# Patient Record
Sex: MALE | Race: BLACK OR AFRICAN AMERICAN | Employment: FULL TIME | URBAN - METROPOLITAN AREA
[De-identification: names, ages, dates, MRNs, and addresses within clinical notes are randomized per-mention and may not be internally consistent; named-entity substitution may affect disease eponyms.]

---

## 2021-11-30 ENCOUNTER — HOSPITAL ENCOUNTER (OUTPATIENT)
Dept: GENERAL RADIOLOGY | Age: 46
Discharge: HOME | End: 2021-11-30
Payer: COMMERCIAL

## 2021-11-30 ENCOUNTER — OFFICE VISIT (OUTPATIENT)
Dept: FAMILY MEDICINE CLINIC | Age: 46
End: 2021-11-30
Payer: COMMERCIAL

## 2021-11-30 VITALS
SYSTOLIC BLOOD PRESSURE: 123 MMHG | TEMPERATURE: 98.5 F | DIASTOLIC BLOOD PRESSURE: 71 MMHG | BODY MASS INDEX: 25.01 KG/M2 | OXYGEN SATURATION: 98 % | WEIGHT: 165 LBS | RESPIRATION RATE: 18 BRPM | HEART RATE: 81 BPM | HEIGHT: 68 IN

## 2021-11-30 DIAGNOSIS — R06.02 SHORTNESS OF BREATH: ICD-10-CM

## 2021-11-30 DIAGNOSIS — R05.9 COUGH: ICD-10-CM

## 2021-11-30 DIAGNOSIS — Z11.59 ENCOUNTER FOR HEPATITIS C SCREENING TEST FOR LOW RISK PATIENT: ICD-10-CM

## 2021-11-30 DIAGNOSIS — R06.02 SHORTNESS OF BREATH: Primary | ICD-10-CM

## 2021-11-30 DIAGNOSIS — E11.9 TYPE 2 DIABETES MELLITUS WITHOUT COMPLICATION, WITHOUT LONG-TERM CURRENT USE OF INSULIN (HCC): ICD-10-CM

## 2021-11-30 DIAGNOSIS — K59.09 OTHER CONSTIPATION: ICD-10-CM

## 2021-11-30 DIAGNOSIS — E78.00 PURE HYPERCHOLESTEROLEMIA: ICD-10-CM

## 2021-11-30 DIAGNOSIS — E55.9 VITAMIN D DEFICIENCY: ICD-10-CM

## 2021-11-30 DIAGNOSIS — I10 ESSENTIAL HYPERTENSION: ICD-10-CM

## 2021-11-30 PROCEDURE — 99204 OFFICE O/P NEW MOD 45 MIN: CPT | Performed by: NURSE PRACTITIONER

## 2021-11-30 PROCEDURE — 71046 X-RAY EXAM CHEST 2 VIEWS: CPT

## 2021-11-30 RX ORDER — AMLODIPINE BESYLATE 5 MG/1
5 TABLET ORAL DAILY
Qty: 90 TABLET | Refills: 1 | Status: SHIPPED | OUTPATIENT
Start: 2021-11-30 | End: 2022-05-03 | Stop reason: SDUPTHER

## 2021-11-30 RX ORDER — PROMETHAZINE HYDROCHLORIDE AND DEXTROMETHORPHAN HYDROBROMIDE 6.25; 15 MG/5ML; MG/5ML
5 SYRUP ORAL
Qty: 210 ML | Refills: 1 | Status: SHIPPED | OUTPATIENT
Start: 2021-11-30 | End: 2021-12-16 | Stop reason: ALTCHOICE

## 2021-11-30 RX ORDER — LOSARTAN POTASSIUM 100 MG/1
100 TABLET ORAL DAILY
Qty: 90 TABLET | Refills: 1 | Status: SHIPPED | OUTPATIENT
Start: 2021-11-30

## 2021-11-30 RX ORDER — LOSARTAN POTASSIUM 100 MG/1
100 TABLET ORAL DAILY
COMMUNITY
Start: 2021-09-26 | End: 2021-11-30 | Stop reason: SDUPTHER

## 2021-11-30 RX ORDER — DOCUSATE SODIUM 100 MG/1
CAPSULE, LIQUID FILLED ORAL
Qty: 180 CAPSULE | Refills: 3 | Status: SHIPPED | OUTPATIENT
Start: 2021-11-30

## 2021-11-30 RX ORDER — LANCETS
EACH MISCELLANEOUS
Qty: 100 EACH | Refills: 11 | Status: SHIPPED | OUTPATIENT
Start: 2021-11-30

## 2021-11-30 RX ORDER — ISOPROPYL ALCOHOL 70 ML/100ML
SWAB TOPICAL
Qty: 100 PAD | Refills: 11 | Status: SHIPPED | OUTPATIENT
Start: 2021-11-30

## 2021-11-30 RX ORDER — AMLODIPINE BESYLATE 5 MG/1
5 TABLET ORAL DAILY
COMMUNITY
Start: 2021-09-26 | End: 2021-11-30 | Stop reason: SDUPTHER

## 2021-11-30 RX ORDER — POLYETHYLENE GLYCOL 3350 17 G/17G
POWDER, FOR SOLUTION ORAL
Qty: 850 G | Refills: 3 | Status: SHIPPED | OUTPATIENT
Start: 2021-11-30

## 2021-11-30 RX ORDER — METFORMIN HYDROCHLORIDE 850 MG/1
850 TABLET ORAL DAILY
COMMUNITY
Start: 2021-09-26 | End: 2022-05-03 | Stop reason: SDUPTHER

## 2021-11-30 RX ORDER — BLOOD SUGAR DIAGNOSTIC
STRIP MISCELLANEOUS
Qty: 100 STRIP | Refills: 11 | Status: SHIPPED | OUTPATIENT
Start: 2021-11-30

## 2021-11-30 NOTE — PATIENT INSTRUCTIONS
Constipation: Care Instructions  Your Care Instructions     Constipation means that you have a hard time passing stools (bowel movements). People pass stools from 3 times a day to once every 3 days. What is normal for you may be different. Constipation may occur with pain in the rectum and cramping. The pain may get worse when you try to pass stools. Sometimes there are small amounts of bright red blood on toilet paper or the surface of stools. This is because of enlarged veins near the rectum (hemorrhoids). A few changes in your diet and lifestyle may help you avoid ongoing constipation. Your doctor may also prescribe medicine to help loosen your stool. Some medicines can cause constipation. These include pain medicines and antidepressants. Tell your doctor about all the medicines you take. Your doctor may want to make a medicine change to ease your symptoms. Follow-up care is a key part of your treatment and safety. Be sure to make and go to all appointments, and call your doctor if you are having problems. It's also a good idea to know your test results and keep a list of the medicines you take. How can you care for yourself at home? · Drink plenty of fluids. If you have kidney, heart, or liver disease and have to limit fluids, talk with your doctor before you increase the amount of fluids you drink. · Include high-fiber foods in your diet each day. These include fruits, vegetables, beans, and whole grains. · Get at least 30 minutes of exercise on most days of the week. Walking is a good choice. You also may want to do other activities, such as running, swimming, cycling, or playing tennis or team sports. · Take a fiber supplement, such as Citrucel or Metamucil, every day. Read and follow all instructions on the label. · Schedule time each day for a bowel movement. A daily routine may help. Take your time having your bowel movement.   · Support your feet with a small step stool when you sit on the toilet. This helps flex your hips and places your pelvis in a squatting position. · Your doctor may recommend an over-the-counter laxative to relieve your constipation. Examples are Milk of Magnesia and MiraLax. Read and follow all instructions on the label. Do not use laxatives on a long-term basis. When should you call for help? Call your doctor now or seek immediate medical care if:    · You have new or worse belly pain.     · You have new or worse nausea or vomiting.     · You have blood in your stools. Watch closely for changes in your health, and be sure to contact your doctor if:    · Your constipation is getting worse.     · You do not get better as expected. Where can you learn more? Go to http://www.friedman.com/  Enter P343 in the search box to learn more about \"Constipation: Care Instructions. \"  Current as of: July 1, 2021               Content Version: 13.0  © 6998-1290 [employer]. Care instructions adapted under license by [employer] (which disclaims liability or warranty for this information). If you have questions about a medical condition or this instruction, always ask your healthcare professional. [place] [address] any warranty or liability for your use of this information. Home Blood Pressure Test: About This Test  What is it? A home blood pressure test allows you to keep track of your blood pressure at home. Blood pressure is a measure of the force of blood against the walls of your arteries. Blood pressure readings include two numbers, such as 130/80 (say \"130 over 80\"). The first number is the systolic pressure. The second number is the diastolic pressure. Why is this test done? You may do this test at home to:  · Find out if you have high blood pressure. · Track your blood pressure if you have high blood pressure. · Track how well medicine is working to reduce high blood pressure.   · Check how lifestyle changes, such as weight loss and exercise, are affecting blood pressure. How do you prepare for the test?  For at least 30 minutes before you take your blood pressure, don't exercise, drink caffeine, or smoke. Empty your bladder before the test. Sit quietly with your back straight and both feet on the floor for at least 5 minutes. This helps you take your blood pressure while you feel comfortable and relaxed. How is the test done? · If your doctor recommends it, take your blood pressure twice a day. Take it in the morning and evening. · Sit with your arm slightly bent and resting on a table so that your upper arm is at the same level as your heart. · Use the same arm each time you take your blood pressure. · Place the blood pressure cuff on the bare skin of your upper arm. You may have to roll up your sleeve, remove your arm from the sleeve, or take your shirt off. · Wrap the blood pressure cuff around your upper arm so that the lower edge of the cuff is about 1 inch above the bend of your elbow. · Do not move, talk, or text while you take your blood pressure. Follow the instructions that came with your blood pressure monitor. They might be different from the following. · Press the on/off button on the automatic monitor. Then you may need to wait until the screen says the monitor is ready. · Press the start button. The cuff will inflate and deflate by itself. · Your blood pressure numbers will appear on the screen. · Wait one minute and take your blood pressure again. · If your monitor does not automatically save your numbers, write them in your log book, along with the date and time. Follow-up care is a key part of your treatment and safety. Be sure to make and go to all appointments, and call your doctor if you are having problems. It's also a good idea to keep a list of the medicines you take. Where can you learn more?   Go to http://www.gray.com/  Enter C427 in the search box to learn more about \"Home Blood Pressure Test: About This Test.\"  Current as of: April 29, 2021               Content Version: 13.0  © 2006-2021 [employer]. Care instructions adapted under license by [employer] (which disclaims liability or warranty for this information). If you have questions about a medical condition or this instruction, always ask your healthcare professional. [place] [address] any warranty or liability for your use of this information.

## 2021-11-30 NOTE — PROGRESS NOTES
Chief Complaint   Patient presents with    [facility] Care     Pt need a New PCP. Pt states he be having trouble breathing off and on for years     Visit Vitals  /71 (BP 1 Location: Right arm, BP Patient Position: Sitting, BP Cuff Size: Adult)   Pulse 81   Temp 98.5 °F (36.9 °C) (Temporal)   Resp 18   Ht 5' 8\" (1.727 m)   Wt 165 lb (74.8 kg)   SpO2 98%   BMI 25.09 kg/m²     Subjective  Imnaha Daryle Piper is a 55 y.o. male. HPI:  Pt presented to [facility]. Review of what is in his Epic record seems to indicate that he has been seen at urgent care facilities for medical needs in recent past. Pt stated he needs all his current medications refilled to include blood glucose testing supplies. Also needs fasting lab work to check the statuses of his chronic medical conditions. He is a diabetic since 2015 per review of his record. He is checking his BS at home infrequently. A typical random BS is 131. Current medication regime is Metformin 850 mg daily. He does not remember what his last A1c was or whether he is controlled. He has shortness of breath off and on x 3 yrs. He has had an EKG done and was ok. He was seen about 3 mo ago at [facility] for the SOB and was found to have pneumonia on chest xray. C He has not had an xray since. He said the SOB is now severe. He has a persistent dry cough. He was not tested for Covid 19 at [facility].    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 He does not know of any particular exposure to dust or chemicals in his life presently or in past.   Has dx of HTN- current meds amlodipine 5 mg and losartan. B/P in office good at 123/71. Pt said he has dx of hypercholesterolemia - he does not know last LDL number. Not currently on a statin as part of T2DM management. Has hx of chronic constipation. He was never told he might have IBS. Not currently taking medication to treat.      Patient Active Problem List   Diagnosis Code    Shortness of breath R06.02    Cough R05.9    Type 2 diabetes mellitus without complication, without long-term current use of insulin (HCC) E11.9    Essential hypertension I10    Pure hypercholesterolemia E78.00    Vitamin D deficiency E55.9    Other constipation K59.09     Past Medical History:   Diagnosis Date    Diabetes ([address]     Hypertension      History reviewed. No pertinent surgical history. Current Outpatient Medications   Medication Instructions    alcohol swabs (Alcohol Wipes) padm Use to clean skin prior to checking BS 1 x daily.  amLODIPine (NORVASC) 5 mg, Oral, DAILY    atorvastatin (LIPITOR) 40 mg, Oral, DAILY    docusate sodium (COLACE) 100 mg capsule Take 1 capsule 2 x day by mouth to help constipation.  glucose blood VI test strips (Accu-Chek Guide test strips) strip Use to check BS once daily for diabetes.  lancets misc Use to check BS once daily.  losartan (COZAAR) 100 mg, Oral, DAILY    metFORMIN (GLUCOPHAGE) 850 mg, Oral, DAILY    polyethylene glycol (MIRALAX) 17 gram/dose powder Take 17 gram by mouth 2 x day for constipation.  promethazine-dextromethorphan (PROMETHAZINE-DM) 6.25-15 mg/5 mL syrup 5 mL, Oral, EVERY 4 HOURS AS NEEDED     No Known Allergies  Social History     Tobacco Use    Smoking status: Never Smoker    Smokeless tobacco: Never Used   Vaping Use    Vaping Use: Never used   Substance Use Topics    Alcohol use: Yes    Drug use: Never     Family History   Problem Relation Age of Onset    Hypertension Mother      Review of Systems   Constitutional: Negative for chills and fever. HENT: Positive for sore throat. Negative for congestion and ear pain. Eyes: Negative for blurred vision. Respiratory: Positive for cough and shortness of breath. Negative for wheezing. Cardiovascular: Negative for chest pain and palpitations. Gastrointestinal: Positive for constipation. Negative for abdominal pain, diarrhea, heartburn, nausea and vomiting. Long term issue.  Not taking any OTC medications now.    Genitourinary: Negative for dysuria. Musculoskeletal: Negative for joint pain and myalgias. Neurological: Positive for headaches. Negative for dizziness, tingling and sensory change. Has a severe headache on R side every 2 weeks. Psychiatric/Behavioral: Negative for depression. The patient has insomnia. The patient is not nervous/anxious. He can only sleep 3 hours at a time and has trouble getting back to sleep. Objective  Physical Exam  Vitals reviewed. Constitutional:       General: He is not in acute distress. Appearance: Normal appearance. HENT:      Head: Normocephalic. Right Ear: External ear normal.      Left Ear: External ear normal.      Nose: Nose normal.   Eyes:      Conjunctiva/sclera: Conjunctivae normal.   Neck:      Vascular: No carotid bruit. Cardiovascular:      Rate and Rhythm: Normal rate and regular rhythm. Heart sounds: Normal heart sounds. No murmur heard. Pulmonary:      Effort: Pulmonary effort is normal. No respiratory distress. Breath sounds: Normal breath sounds. Musculoskeletal:         General: No swelling or tenderness. Normal range of motion. Cervical back: Neck supple. Right lower leg: No edema. Left lower leg: No edema. Skin:     General: Skin is warm and dry. Coloration: Skin is not jaundiced. Findings: No lesion or rash. Neurological:      Mental Status: He is alert and oriented to person, place, and time. Motor: No weakness. Gait: Gait normal.   Psychiatric:         Mood and Affect: Mood normal.         Behavior: Behavior normal.         Thought Content: Thought content normal.         Judgment: Judgment normal.       Assessment & Plan      ICD-10-CM ICD-9-CM    1. Shortness of breath  R06.02 786.05 XR CHEST PA LAT   2. Cough  R05.9 786.2 promethazine-dextromethorphan (PROMETHAZINE-DM) 6.25-15 mg/5 mL syrup   3.  Type 2 diabetes mellitus without complication, without long-term current use of insulin ([facility])  E11.9 250.00 glucose blood VI test strips (Accu-Chek Guide test strips) strip      lancets misc      alcohol swabs (Alcohol Wipes) padm      METABOLIC PANEL, COMPREHENSIVE      HEMOGLOBIN A1C WITH EAG      MICROALBUMIN, UR, RAND W/ MICROALB/CREAT RATIO   4. Essential hypertension  I10 401.9 amLODIPine (NORVASC) 5 mg tablet      losartan (COZAAR) 100 mg tablet   5. Pure hypercholesterolemia  E78.00 272.0 CBC WITH AUTOMATED DIFF      RETICULOCYTE COUNT      LIPID PANEL   6. Vitamin D deficiency  E55.9 268.9 VITAMIN D, 25 HYDROXY   7. Other constipation  K59.09 564.09 polyethylene glycol (MIRALAX) 17 gram/dose powder      docusate sodium (COLACE) 100 mg capsule   8. Encounter for hepatitis C screening test for low risk patient  Z11.59 V73.89 HCV AB W/RFLX TO ROSSI     1. Shortness of breath  If chest xray is unremarkable, will refer to pulmonary. - XR CHEST PA LAT; Future    2. Cough  Pt advised to stay well hydrated and avoid other antihistamines while on cough syrup. - promethazine-dextromethorphan (PROMETHAZINE-DM) 6.25-15 mg/5 mL syrup; Take 5 mL by mouth every four (4) hours as needed for Cough. Dispense: 210 mL; Refill: 1    3. Type 2 diabetes mellitus without complication, without long-term current use of insulin ([facility]  Adjust medication if indicated per lab work. - glucose blood VI test strips (Accu-Chek Guide test strips) strip; Use to check BS once daily for diabetes. Dispense: 100 Strip; Refill: 11  - lancets misc; Use to check BS once daily. Dispense: 100 Each; Refill: 11  - alcohol swabs (Alcohol Wipes) padm; Use to clean skin prior to checking BS 1 x daily. Dispense: 100 Pad; Refill: 11  - METABOLIC PANEL, COMPREHENSIVE  - HEMOGLOBIN A1C WITH EAG  - MICROALBUMIN, UR, RAND W/ MICROALB/CREAT RATIO    4. Essential hypertension  Stable on current med regime which he will continue. - amLODIPine (NORVASC) 5 mg tablet; Take 1 Tablet by mouth daily. Indications: high blood pressure  Dispense: 90 Tablet; Refill: 1  - losartan (COZAAR) 100 mg tablet; Take 1 Tablet by mouth daily. Indications: high blood pressure  Dispense: 90 Tablet; Refill: 1    5. Pure hypercholesterolemia    - CBC WITH AUTOMATED DIFF  - RETICULOCYTE COUNT  - LIPID PANEL    6. Vitamin D deficiency  Add on supplementation if indicated. - VITAMIN D, 25 HYDROXY    7. Other constipation  Pt advised to stay well hydrated and to take both medications 2 x day until has reestablished a bowel regime and then cut back doses as needed. - polyethylene glycol (MIRALAX) 17 gram/dose powder; Take 17 gram by mouth 2 x day for constipation. Dispense: 850 g; Refill: 3  - docusate sodium (COLACE) 100 mg capsule; Take 1 capsule 2 x day by mouth to help constipation. Dispense: 180 Capsule; Refill: 3    8. Encounter for hepatitis C screening test for low risk patient  One time recommended screening for pt in his  age group per new guidelines. - HCV AB W/RFLX TO ROSSI    I have discussed the diagnosis with the patient and the intended plan as seen in the above orders. Pt/caretaker has expressed understanding. Questions were answered concerning future plans. I have discussed medication side effects and warnings as indicated with the patient as well.     Clem Donald, MAI

## 2021-11-30 NOTE — PROGRESS NOTES
1. Have you been to the ER, urgent care clinic since your last visit? Hospitalized since your last visit? Pt went to [facility] for trouble breathing. Pt had pneuo    2. Have you seen or consulted any other health care providers outside of the [address] [place] [address] since your last visit? Include any pap smears or colon screening. No   Chief Complaint   Patient presents with    Establish Care     Pt need a New PCP.  Pt states he be having trouble breathing off and on for years     Visit Vitals  /71 (BP 1 Location: Right arm, BP Patient Position: Sitting, BP Cuff Size: Adult)   Pulse 81   Temp 98.5 °F (36.9 °C) (Temporal)   Resp 18   Ht 5' 8\" (1.727 m)   Wt 165 lb (74.8 kg)   SpO2 98%   BMI 25.09 kg/m²

## 2021-12-02 LAB
25(OH)D3+25(OH)D2 SERPL-MCNC: 27.8 NG/ML (ref 30–100)
ALBUMIN/CREAT UR: 3 MG/G CREAT (ref 0–29)
BASOPHILS # BLD AUTO: 0 X10E3/UL (ref 0–0.2)
BASOPHILS NFR BLD AUTO: 0 %
CHOLEST SERPL-MCNC: 193 MG/DL (ref 100–199)
CREAT UR-MCNC: 108.3 MG/DL
EOSINOPHIL # BLD AUTO: 0.1 X10E3/UL (ref 0–0.4)
EOSINOPHIL NFR BLD AUTO: 1 %
ERYTHROCYTE [DISTWIDTH] IN BLOOD BY AUTOMATED COUNT: 12.6 % (ref 11.6–15.4)
EST. AVERAGE GLUCOSE BLD GHB EST-MCNC: 126 MG/DL
HBA1C MFR BLD: 6 % (ref 4.8–5.6)
HCT VFR BLD AUTO: 45.3 % (ref 37.5–51)
HCV AB S/CO SERPL IA: <0.1 S/CO RATIO (ref 0–0.9)
HCV AB SERPL QL IA: NORMAL
HDLC SERPL-MCNC: 48 MG/DL
HGB BLD-MCNC: 15.1 G/DL (ref 13–17.7)
IMM GRANULOCYTES # BLD AUTO: 0 X10E3/UL (ref 0–0.1)
IMM GRANULOCYTES NFR BLD AUTO: 0 %
LDLC SERPL CALC-MCNC: 132 MG/DL (ref 0–99)
LYMPHOCYTES # BLD AUTO: 1.5 X10E3/UL (ref 0.7–3.1)
LYMPHOCYTES NFR BLD AUTO: 18 %
MCH RBC QN AUTO: 28.1 PG (ref 26.6–33)
MCHC RBC AUTO-ENTMCNC: 33.3 G/DL (ref 31.5–35.7)
MCV RBC AUTO: 84 FL (ref 79–97)
MICROALBUMIN UR-MCNC: 3.5 UG/ML
MONOCYTES # BLD AUTO: 1 X10E3/UL (ref 0.1–0.9)
MONOCYTES NFR BLD AUTO: 12 %
NEUTROPHILS # BLD AUTO: 5.7 X10E3/UL (ref 1.4–7)
NEUTROPHILS NFR BLD AUTO: 69 %
PLATELET # BLD AUTO: 168 X10E3/UL (ref 150–450)
RBC # BLD AUTO: 5.37 X10E6/UL (ref 4.14–5.8)
RETICS/RBC NFR AUTO: 1.4 % (ref 0.6–2.6)
TRIGL SERPL-MCNC: 73 MG/DL (ref 0–149)
VLDLC SERPL CALC-MCNC: 13 MG/DL (ref 5–40)
WBC # BLD AUTO: 8.4 X10E3/UL (ref 3.4–10.8)

## 2021-12-06 DIAGNOSIS — E78.00 PURE HYPERCHOLESTEROLEMIA: Primary | ICD-10-CM

## 2021-12-06 DIAGNOSIS — R06.02 SHORTNESS OF BREATH: Primary | ICD-10-CM

## 2021-12-06 PROBLEM — E55.9 VITAMIN D DEFICIENCY: Status: ACTIVE | Noted: 2021-12-06

## 2021-12-06 PROBLEM — I10 ESSENTIAL HYPERTENSION: Status: ACTIVE | Noted: 2021-12-06

## 2021-12-06 PROBLEM — K59.09 OTHER CONSTIPATION: Status: ACTIVE | Noted: 2021-12-06

## 2021-12-06 PROBLEM — R05.9 COUGH: Status: ACTIVE | Noted: 2021-12-06

## 2021-12-06 PROBLEM — E11.9 TYPE 2 DIABETES MELLITUS WITHOUT COMPLICATION, WITHOUT LONG-TERM CURRENT USE OF INSULIN (HCC): Status: ACTIVE | Noted: 2021-12-06

## 2021-12-06 RX ORDER — ATORVASTATIN CALCIUM 40 MG/1
40 TABLET, FILM COATED ORAL DAILY
Qty: 90 TABLET | Refills: 3 | Status: SHIPPED | OUTPATIENT
Start: 2021-12-06

## 2021-12-16 ENCOUNTER — OFFICE VISIT (OUTPATIENT)
Dept: FAMILY MEDICINE CLINIC | Age: 46
End: 2021-12-16
Payer: COMMERCIAL

## 2021-12-16 VITALS
OXYGEN SATURATION: 98 % | SYSTOLIC BLOOD PRESSURE: 130 MMHG | TEMPERATURE: 97.8 F | HEART RATE: 71 BPM | WEIGHT: 159 LBS | BODY MASS INDEX: 24.18 KG/M2 | RESPIRATION RATE: 16 BRPM | DIASTOLIC BLOOD PRESSURE: 82 MMHG

## 2021-12-16 DIAGNOSIS — R09.81 NASAL CONGESTION: ICD-10-CM

## 2021-12-16 DIAGNOSIS — R06.02 SHORTNESS OF BREATH: Primary | ICD-10-CM

## 2021-12-16 DIAGNOSIS — G47.09 OTHER INSOMNIA: ICD-10-CM

## 2021-12-16 PROCEDURE — 99214 OFFICE O/P EST MOD 30 MIN: CPT | Performed by: NURSE PRACTITIONER

## 2021-12-16 RX ORDER — HYDROXYZINE PAMOATE 50 MG/1
CAPSULE ORAL
Qty: 180 CAPSULE | Refills: 1 | Status: SHIPPED | OUTPATIENT
Start: 2021-12-16

## 2021-12-16 RX ORDER — FLUTICASONE PROPIONATE 50 MCG
SPRAY, SUSPENSION (ML) NASAL
Qty: 1 EACH | Refills: 3 | Status: SHIPPED | OUTPATIENT
Start: 2021-12-16

## 2021-12-16 RX ORDER — GLIPIZIDE 10 MG/1
10 TABLET ORAL
COMMUNITY
End: 2022-05-03 | Stop reason: SDUPTHER

## 2021-12-16 NOTE — PROGRESS NOTES
Chief Complaint   Patient presents with    Weight Loss    Shortness of Breath    Insomnia     Patient reports difficulty falling asleep and staying asleep      1. Have you been to the ER, urgent care clinic since your last visit? Hospitalized since your last visit? No    2. Have you seen or consulted any other health care providers outside of the [address] [place] [address] since your last visit? Include any pap smears or colon screening.  Dental work   Visit Vitals  /82 (BP 1 Location: Right arm, BP Patient Position: Sitting, BP Cuff Size: Adult)   Pulse 71   Temp 97.8 °F (36.6 °C) (Skin)   Resp 16   Wt 159 lb (72.1 kg)   SpO2 98%   BMI 24.18 kg/m²

## 2021-12-16 NOTE — PROGRESS NOTES
Chief Complaint   Patient presents with    Weight Loss    Shortness of Breath    Insomnia     Patient reports difficulty falling asleep and staying asleep      Visit Vitals  /82 (BP 1 Location: Right arm, BP Patient Position: Sitting, BP Cuff Size: Adult)   Pulse 71   Temp 97.8 °F (36.6 °C) (Skin)   Resp 16   Wt 159 lb (72.1 kg)   SpO2 98%   BMI 24.18 kg/m²     Subjective  Starbuck Michael Avery is a 55 y.o. male. HPI   Patient presented for follow-up. Accompanied by his wife. He was seen in office on 11/30/2021 to establish. His previous medical care recently had been at urgent care facilities. He was notified of his recent lab results but will review again during appointment. Has had shortness of breath off and on x 3 yrs. He had had an EKG done and was ok. About 3 mo ago he was seen at [facility] for the SOB and was found to have pneumonia on chest xray. I repeated the x-ray and it was read as a normal study. Patient was notified of the result. Continues to have a persistent dry cough. Patient states that he has noticed that SOB seems to occur when he does not get enough sleep. Patient was referred to pulmonologist for evaluation and needs to call for an appointment ASAP. Patient stated he has lost 6 pounds in the last 17 days. Stated he is eating normally but his wife disagreed and said he does not eat well. Less might account for his loss of weight. His weight is now in the normal BMI range. Type II DM- Latest A1c 6.0 indicating good control. Is on Metformin 850 mg daily and glipizide 10 mg two a day. No reported hypoglycemic symptoms. Continue on same medication regime. Hypertension-BP good in office at 130/82 as well as home BP checks. He continues on losartan 100 mg daily and amlodipine 5 mg daily and will continue on current medication regime. Hyperlipidemia-patient's last LDL was 132 mg/dl. He was not on a statin for the blood draw.   He was agreeable to starting a statin and was instructed that ideal LDL for diabetic is below 70. We will recheck labs in 3 to 4 months for status. Patient stated he is not sleeping well. He works the night shift and so this is a factor because he has two jobs and also works during the day. Patient needs to work two jobs for financial stability. Patient Active Problem List   Diagnosis Code    Shortness of breath R06.02    Cough R05.9    Type 2 diabetes mellitus without complication, without long-term current use of insulin ([facility]) E11.9    Essential hypertension I10    Pure hypercholesterolemia E78.00    Vitamin D deficiency E55.9    Other constipation K59.09     Past Medical History:   Diagnosis Date    Diabetes ([facility]     Hypertension      History reviewed. No pertinent surgical history. Current Outpatient Medications   Medication Instructions    alcohol swabs (Alcohol Wipes) padm Use to clean skin prior to checking BS 1 x daily.  amLODIPine (NORVASC) 5 mg, Oral, DAILY    atorvastatin (LIPITOR) 40 mg, Oral, DAILY    docusate sodium (COLACE) 100 mg capsule Take 1 capsule 2 x day by mouth to help constipation.  fluticasone propionate (FLONASE) 50 mcg/actuation nasal spray Instill 2 sprays in each nostril daily for nasal congestion as needed.  glipiZIDE (GLUCOTROL) 10 mg, Oral, 2 TIMES DAILY AS NEEDED    glucose blood VI test strips (Accu-Chek Guide test strips) strip Use to check BS once daily for diabetes.  hydrOXYzine pamoate (VISTARIL) 50 mg capsule Take 1-2 caps by mouth 30-60 min prior to bedtime for insomnia.  lancets misc Use to check BS once daily.  losartan (COZAAR) 100 mg, Oral, DAILY    metFORMIN (GLUCOPHAGE) 850 mg, Oral, DAILY    polyethylene glycol (MIRALAX) 17 gram/dose powder Take 17 gram by mouth 2 x day for constipation.      No Known Allergies  Social History     Tobacco Use    Smoking status: Never Smoker    Smokeless tobacco: Never Used   Vaping Use    Vaping Use: Never used   Substance Use Topics    Alcohol use: Yes    Drug use: Never     Family History   Problem Relation Age of Onset    Hypertension Mother        Review of Systems   Constitutional: Negative for fever. HENT: Negative for ear pain and sore throat. Respiratory: Positive for cough and shortness of breath. Negative for wheezing. Occasional.   Cardiovascular: Negative for chest pain, palpitations and leg swelling. Gastrointestinal: Negative for abdominal pain, constipation, diarrhea, heartburn, nausea and vomiting. Genitourinary: Negative for dysuria. Musculoskeletal: Negative for back pain, joint pain, myalgias and neck pain. Neurological: Negative for dizziness, tremors and sensory change. Psychiatric/Behavioral: Negative for depression. The patient has insomnia. The patient is not nervous/anxious. Objective  Physical Exam  Vitals reviewed. Constitutional:       General: He is not in acute distress. Appearance: Normal appearance. HENT:      Head: Normocephalic. Right Ear: External ear normal.      Left Ear: External ear normal.      Nose: Nose normal.   Eyes:      Conjunctiva/sclera: Conjunctivae normal.   Neck:      Vascular: No carotid bruit. Cardiovascular:      Rate and Rhythm: Normal rate and regular rhythm. Heart sounds: Normal heart sounds. No murmur heard. Pulmonary:      Effort: Pulmonary effort is normal. No respiratory distress. Breath sounds: Normal breath sounds. Musculoskeletal:         General: No swelling or tenderness. Normal range of motion. Cervical back: Neck supple. Right lower leg: No edema. Left lower leg: No edema. Skin:     General: Skin is warm and dry. Coloration: Skin is not jaundiced. Findings: No lesion or rash. Neurological:      Mental Status: He is alert and oriented to person, place, and time. Motor: No weakness.       Gait: Gait normal.   Psychiatric:         Mood and Affect: Mood normal.         Behavior: Behavior normal.         Thought Content: Thought content normal.         Judgment: Judgment normal.       Assessment & Plan      ICD-10-CM ICD-9-CM    1. Shortness of breath  R06.02 786.05    2. Other insomnia  G47.09 780.52 hydrOXYzine pamoate (VISTARIL) 50 mg capsule   3. Nasal congestion  R09.81 478.19 fluticasone propionate (FLONASE) 50 mcg/actuation nasal spray       1. Shortness of breath  She advised to call pulmonology office ASAP to schedule an appointment. Will review consult notes when available. 2. Other insomnia  Patient was advised that on his days off if he has trouble falling to sleep that he could try the hydroxyzine to help him get drowsy so he can sleep better. He will consider.    - hydrOXYzine pamoate (VISTARIL) 50 mg capsule; Take 1-2 caps by mouth 30-60 min prior to bedtime for insomnia. Dispense: 180 Capsule; Refill: 1    3. Nasal congestion  Effective for symptoms when they occur.    - fluticasone propionate (FLONASE) 50 mcg/actuation nasal spray; Instill 2 sprays in each nostril daily for nasal congestion as needed. Dispense: 1 Each; Refill: 3    I have discussed the diagnosis with the patient and the intended plan as seen in the above orders. Pt/caretaker has expressed understanding. Questions were answered concerning future plans. I have discussed medication side effects and warnings as indicated with the patient as well.     James Toney NP

## 2021-12-30 ENCOUNTER — TELEPHONE (OUTPATIENT)
Dept: FAMILY MEDICINE CLINIC | Age: 46
End: 2021-12-30

## 2021-12-30 NOTE — TELEPHONE ENCOUNTER
Pharmacy sent fax stating that Fluticasone 50 mcg is not covered by patient's insurance, please send alternative Rx.

## 2022-01-25 ENCOUNTER — TRANSCRIBE ORDER (OUTPATIENT)
Dept: RESPIRATORY THERAPY | Age: 47
End: 2022-01-25

## 2022-01-25 DIAGNOSIS — J45.909 ASTHMA, UNSPECIFIED ASTHMA SEVERITY, UNSPECIFIED WHETHER COMPLICATED, UNSPECIFIED WHETHER PERSISTENT: Primary | ICD-10-CM

## 2022-02-07 ENCOUNTER — HOSPITAL ENCOUNTER (OUTPATIENT)
Dept: PREADMISSION TESTING | Age: 47
Discharge: HOME | End: 2022-02-07
Payer: COMMERCIAL

## 2022-02-07 LAB — SARS-COV-2, COV2: NORMAL

## 2022-02-07 PROCEDURE — U0005 INFEC AGEN DETEC AMPLI PROBE: HCPCS

## 2022-02-08 LAB
SARS-COV-2, XPLCVT: NOT DETECTED
SOURCE, COVRS: NORMAL

## 2022-02-10 ENCOUNTER — HOSPITAL ENCOUNTER (OUTPATIENT)
Dept: PULMONOLOGY | Age: 47
Discharge: HOME | End: 2022-02-10
Payer: COMMERCIAL

## 2022-02-10 PROCEDURE — 94375 RESPIRATORY FLOW VOLUME LOOP: CPT

## 2022-02-10 PROCEDURE — 94726 PLETHYSMOGRAPHY LUNG VOLUMES: CPT

## 2022-02-10 PROCEDURE — 94729 DIFFUSING CAPACITY: CPT

## 2022-03-18 PROBLEM — E11.9 TYPE 2 DIABETES MELLITUS WITHOUT COMPLICATION, WITHOUT LONG-TERM CURRENT USE OF INSULIN (HCC): Status: ACTIVE | Noted: 2021-12-06

## 2022-03-18 PROBLEM — I10 ESSENTIAL HYPERTENSION: Status: ACTIVE | Noted: 2021-12-06

## 2022-03-19 PROBLEM — E78.00 PURE HYPERCHOLESTEROLEMIA: Status: ACTIVE | Noted: 2021-12-06

## 2022-03-19 PROBLEM — K59.09 OTHER CONSTIPATION: Status: ACTIVE | Noted: 2021-12-06

## 2022-03-19 PROBLEM — R05.9 COUGH: Status: ACTIVE | Noted: 2021-12-06

## 2022-03-19 PROBLEM — E55.9 VITAMIN D DEFICIENCY: Status: ACTIVE | Noted: 2021-12-06

## 2022-03-20 PROBLEM — R06.02 SHORTNESS OF BREATH: Status: ACTIVE | Noted: 2021-12-06

## 2022-05-03 DIAGNOSIS — I10 ESSENTIAL HYPERTENSION: ICD-10-CM

## 2022-05-04 RX ORDER — GLIPIZIDE 10 MG/1
10 TABLET ORAL 2 TIMES DAILY
Qty: 180 TABLET | Refills: 1 | Status: SHIPPED | OUTPATIENT
Start: 2022-05-04

## 2022-05-04 RX ORDER — AMLODIPINE BESYLATE 5 MG/1
5 TABLET ORAL DAILY
Qty: 90 TABLET | Refills: 1 | Status: SHIPPED | OUTPATIENT
Start: 2022-05-04

## 2022-05-04 RX ORDER — METFORMIN HYDROCHLORIDE 850 MG/1
850 TABLET ORAL DAILY
Qty: 90 TABLET | Refills: 1 | Status: SHIPPED | OUTPATIENT
Start: 2022-05-04